# Patient Record
Sex: FEMALE | Race: WHITE | Employment: FULL TIME | URBAN - NONMETROPOLITAN AREA
[De-identification: names, ages, dates, MRNs, and addresses within clinical notes are randomized per-mention and may not be internally consistent; named-entity substitution may affect disease eponyms.]

---

## 2022-08-16 ENCOUNTER — OFFICE VISIT (OUTPATIENT)
Dept: PRIMARY CARE CLINIC | Age: 52
End: 2022-08-16

## 2022-08-16 VITALS
OXYGEN SATURATION: 98 % | DIASTOLIC BLOOD PRESSURE: 72 MMHG | SYSTOLIC BLOOD PRESSURE: 122 MMHG | HEIGHT: 60 IN | HEART RATE: 55 BPM | WEIGHT: 229 LBS | BODY MASS INDEX: 44.96 KG/M2 | RESPIRATION RATE: 18 BRPM

## 2022-08-16 DIAGNOSIS — R20.2 LEFT HAND PARESTHESIA: ICD-10-CM

## 2022-08-16 DIAGNOSIS — S16.1XXA STRAIN OF CERVICAL PORTION OF LEFT TRAPEZIUS MUSCLE: Primary | ICD-10-CM

## 2022-08-16 RX ORDER — LISINOPRIL 40 MG/1
40 TABLET ORAL DAILY
COMMUNITY

## 2022-08-16 RX ORDER — SPIRONOLACTONE 25 MG/1
25 TABLET ORAL DAILY
COMMUNITY

## 2022-08-16 RX ORDER — BUSPIRONE HYDROCHLORIDE 10 MG/1
10 TABLET ORAL 3 TIMES DAILY PRN
COMMUNITY

## 2022-08-16 RX ORDER — TOPIRAMATE 50 MG/1
50 TABLET, FILM COATED ORAL 2 TIMES DAILY
COMMUNITY

## 2022-08-16 RX ORDER — ASPIRIN 81 MG/1
81 TABLET, CHEWABLE ORAL DAILY
COMMUNITY

## 2022-08-16 RX ORDER — METOPROLOL SUCCINATE 25 MG/1
25 TABLET, EXTENDED RELEASE ORAL DAILY
COMMUNITY

## 2022-08-16 RX ORDER — PREDNISONE 20 MG/1
40 TABLET ORAL DAILY
Qty: 10 TABLET | Refills: 0 | Status: SHIPPED | OUTPATIENT
Start: 2022-08-16 | End: 2022-08-21

## 2022-08-16 ASSESSMENT — ENCOUNTER SYMPTOMS
EYES NEGATIVE: 1
VISUAL CHANGE: 0
TROUBLE SWALLOWING: 0

## 2022-08-16 NOTE — PATIENT INSTRUCTIONS
Ice glides, conservative treatment  Follow up in 3-5 days if symptoms are worsening for possible x ray of cervical spine.

## 2022-08-16 NOTE — PROGRESS NOTES
[address] [facility] [address] [place] [address]  Dept: [phone]    Vicki Garner (:  1970) is a 47 y.o. female,New patient, here for evaluation of the following chief complaint(s):  Neck Pain (Believes she might have slept wrong, having left sided shoulder/neck pain that goes down her left arm )  Walk in    ASSESSMENT/PLAN:  1. Strain of cervical portion of left trapezius muscle  2. Left hand paresthesia    Suggested conservative care for suspect muscle strain and trigger point of the left trapezius  Applied Biofreeze in office today and ice pack. She took ibuprofen this morning. Advised to rotate tylenol otc as well        Neck AROM limited due to stiffness and muscle pain:  Extension 10  forward flexion 40  Right lateral flexion 20   left lateral flexion 20  Right rotation 20  Left rotation 20      Advised stretches  Feel paresthesias are related to muscle inflammation and poor posture since this  intensified after coming to work. 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 Advised rest, ice, anti inflammatories  AVS printed with instructions  Follow up 3-5 days if not improving    No follow-ups on file. SUBJECTIVE/OBJECTIVE:  Kathryn awoke today with a stiff posterior neck and tightness to her back muscles. This is located in her upper left posterior shoulder region/trap. She reports as the day progresses the pain is increasing in a tightness sensation and starting to have some paresthesias radiate down to 2 fingers in her left hand  She denies any injury, States she sleeps with her head and neck iin a forward flexed position    Neck Pain   This is a new problem. The current episode started today. The problem occurs constantly. The problem has been gradually worsening. The pain is associated with a sleep position. The quality of the pain is described as shooting.  The pain is at a severity of 5/10. The pain is moderate. The symptoms are aggravated by coughing, bending and position. Associated symptoms include numbness and tingling. Pertinent negatives include no chest pain, fever, headaches, leg pain, pain with swallowing, paresis, syncope, trouble swallowing, visual change, weakness or weight loss. Associated symptoms comments: Left hand 4th and 5th digits. She has tried NSAIDs for the symptoms. The treatment provided no relief. Review of Systems   Constitutional:  Negative for fatigue, fever and weight loss. HENT:  Negative for trouble swallowing. Eyes: Negative. Cardiovascular:  Negative for chest pain and syncope. Musculoskeletal:  Positive for myalgias and neck pain. Negative for arthralgias and neck stiffness. Skin: Negative. Neurological:  Positive for tingling and numbness. Negative for dizziness, weakness and headaches. Physical Exam  Constitutional:       Appearance: Normal appearance. She is obese. Cardiovascular:      Pulses: Normal pulses. Musculoskeletal:         General: No swelling, deformity or signs of injury. Cervical back: Tenderness present. No swelling or bony tenderness. Pain with movement present. Decreased range of motion. Back:    Skin:     General: Skin is warm and dry. Capillary Refill: Capillary refill takes less than 2 seconds. Neurological:      General: No focal deficit present. Mental Status: She is alert and oriented to person, place, and time. Additional Information:    /72 (Site: Left Upper Arm, Position: Sitting, Cuff Size: Large Adult)   Pulse 55   Resp 18   Ht 5' (1.524 m)   Wt 229 lb (103.9 kg)   SpO2 98%   BMI 44.72 kg/m²     An electronic signature was used to authenticate this note.     --CANDACE Sena - CNP

## 2023-03-30 ENCOUNTER — OFFICE VISIT (OUTPATIENT)
Dept: PRIMARY CARE CLINIC | Age: 53
End: 2023-03-30

## 2023-03-30 VITALS
DIASTOLIC BLOOD PRESSURE: 72 MMHG | RESPIRATION RATE: 16 BRPM | SYSTOLIC BLOOD PRESSURE: 118 MMHG | HEIGHT: 60 IN | HEART RATE: 52 BPM | OXYGEN SATURATION: 98 % | WEIGHT: 215 LBS | BODY MASS INDEX: 42.21 KG/M2

## 2023-03-30 DIAGNOSIS — Z00.8 ENCOUNTER FOR BIOMETRIC SCREENING: Primary | ICD-10-CM

## 2023-03-30 DIAGNOSIS — M79.89 LEG SWELLING: ICD-10-CM

## 2023-03-30 RX ORDER — AMLODIPINE BESYLATE 10 MG/1
10 TABLET ORAL DAILY
COMMUNITY
Start: 2023-03-16 | End: 2023-04-15

## 2023-03-30 RX ORDER — FAMOTIDINE 10 MG
10 TABLET ORAL 2 TIMES DAILY
COMMUNITY
Start: 2023-03-16

## 2023-03-30 ASSESSMENT — ENCOUNTER SYMPTOMS
PHOTOPHOBIA: 0
CONSTIPATION: 0
SHORTNESS OF BREATH: 0
DIARRHEA: 0
ABDOMINAL PAIN: 0
CHEST TIGHTNESS: 0

## 2023-03-30 NOTE — PATIENT INSTRUCTIONS
If you are concerned with the ankle swelling, discuss with the provider who prescribes your BP meds. This is a common SE of Amlodipine.

## 2023-03-30 NOTE — PROGRESS NOTES
[address]  Dept: [phone]  Dept Fax: : [phone]  Loc Fax: [phone]    Mike Bruce is a 47 y.o. female who presents today for her medical conditions/complaints as noted below. Chief Complaint   Patient presents with    Wellness Program     Biometric screening            HPI:     47year old female here for Biometric  Pt has PCP  On HTN meds  She is noticing some ankle swelling, reports has had med Change recenty now taking Amlodipine    She has recently had some weight loss  Attempting to make  healthy choices in diet and activity        Current Outpatient Medications   Medication Sig Dispense Refill    amLODIPine (NORVASC) 10 MG tablet Take 10 mg by mouth daily      famotidine (PEPCID) 10 MG tablet Take 10 mg by mouth 2 times daily      topiramate (TOPAMAX) 50 MG tablet Take 50 mg by mouth in the morning and 50 mg before bedtime. spironolactone (ALDACTONE) 25 MG tablet Take 25 mg by mouth in the morning. metoprolol succinate (TOPROL XL) 25 MG extended release tablet Take 25 mg by mouth in the morning. lisinopril (PRINIVIL;ZESTRIL) 40 MG tablet Take 40 mg by mouth in the morning. busPIRone (BUSPAR) 10 MG tablet Take 10 mg by mouth 3 times daily as needed      aspirin 81 MG chewable tablet Take 81 mg by mouth in the morning. No current facility-administered medications for this visit. No Known Allergies    Subjective:      Review of Systems   Constitutional:  Negative for chills, fatigue and fever. HENT:  Negative for congestion. Eyes:  Negative for photophobia and visual disturbance. Respiratory:  Negative for chest tightness and shortness of breath. Cardiovascular:  Positive for leg swelling. Negative for chest pain and palpitations. Gastrointestinal:  Negative for abdominal pain, constipation and diarrhea.    Endocrine:

## 2023-10-12 ENCOUNTER — HOSPITAL ENCOUNTER (EMERGENCY)
Age: 53
Discharge: HOME | End: 2023-10-12
Payer: COMMERCIAL

## 2023-10-12 ENCOUNTER — APPOINTMENT (OUTPATIENT)
Dept: GENERAL RADIOLOGY | Age: 53
End: 2023-10-12
Payer: COMMERCIAL

## 2023-10-12 VITALS
HEIGHT: 60 IN | HEART RATE: 66 BPM | SYSTOLIC BLOOD PRESSURE: 121 MMHG | OXYGEN SATURATION: 99 % | TEMPERATURE: 98.8 F | RESPIRATION RATE: 19 BRPM | BODY MASS INDEX: 41.23 KG/M2 | WEIGHT: 210 LBS | DIASTOLIC BLOOD PRESSURE: 64 MMHG

## 2023-10-12 DIAGNOSIS — R42 LIGHTHEADEDNESS: Primary | ICD-10-CM

## 2023-10-12 DIAGNOSIS — R55 NEAR SYNCOPE: ICD-10-CM

## 2023-10-12 DIAGNOSIS — R07.9 CHEST PAIN, UNSPECIFIED TYPE: ICD-10-CM

## 2023-10-12 PROBLEM — I49.3 ASYMPTOMATIC PVCS: Status: ACTIVE | Noted: 2023-10-12

## 2023-10-12 LAB
ANION GAP SERPL CALC-SCNC: 14 MEQ/L (ref 8–16)
BACTERIA: ABNORMAL
BASOPHILS ABSOLUTE: 0.1 THOU/MM3 (ref 0–0.1)
BASOPHILS NFR BLD AUTO: 1 %
BILIRUB UR QL STRIP: NEGATIVE
BUN SERPL-MCNC: 16 MG/DL (ref 7–22)
CALCIUM SERPL-MCNC: 9.7 MG/DL (ref 8.5–10.5)
CASTS #/AREA URNS LPF: ABNORMAL /LPF
CASTS #/AREA URNS LPF: ABNORMAL /LPF
CHARACTER UR: ABNORMAL
CHARCOAL URNS QL MICRO: ABNORMAL
CHLORIDE SERPL-SCNC: 106 MEQ/L (ref 98–111)
CO2 SERPL-SCNC: 20 MEQ/L (ref 23–33)
COLOR UR: YELLOW
CREAT SERPL-MCNC: 1 MG/DL (ref 0.4–1.2)
CRYSTALS URNS QL MICRO: ABNORMAL
DEPRECATED RDW RBC AUTO: 43.5 FL (ref 35–45)
EKG ATRIAL RATE: 73 BPM
EKG P AXIS: 45 DEGREES
EKG P-R INTERVAL: 142 MS
EKG Q-T INTERVAL: 392 MS
EKG QRS DURATION: 106 MS
EKG QTC CALCULATION (BAZETT): 431 MS
EKG R AXIS: -15 DEGREES
EKG T AXIS: 63 DEGREES
EKG VENTRICULAR RATE: 73 BPM
EOSINOPHIL NFR BLD AUTO: 3.3 %
EOSINOPHILS ABSOLUTE: 0.3 THOU/MM3 (ref 0–0.4)
EPITHELIAL CELLS, UA: ABNORMAL /HPF
ERYTHROCYTE [DISTWIDTH] IN BLOOD BY AUTOMATED COUNT: 12.8 % (ref 11.5–14.5)
GFR SERPL CREATININE-BSD FRML MDRD: > 60 ML/MIN/1.73M2
GLUCOSE SERPL-MCNC: 111 MG/DL (ref 70–108)
GLUCOSE UR QL STRIP.AUTO: NEGATIVE MG/DL
HCT VFR BLD AUTO: 44 % (ref 37–47)
HGB BLD-MCNC: 14.5 GM/DL (ref 12–16)
HGB UR QL STRIP.AUTO: NEGATIVE
IMM GRANULOCYTES # BLD AUTO: 0.04 THOU/MM3 (ref 0–0.07)
IMM GRANULOCYTES NFR BLD AUTO: 0.4 %
KETONES UR QL STRIP.AUTO: NEGATIVE
LEUKOCYTE ESTERASE UR QL STRIP.AUTO: ABNORMAL
LYMPHOCYTES ABSOLUTE: 2.1 THOU/MM3 (ref 1–4.8)
LYMPHOCYTES NFR BLD AUTO: 23 %
MCH RBC QN AUTO: 30.5 PG (ref 26–33)
MCHC RBC AUTO-ENTMCNC: 33 GM/DL (ref 32.2–35.5)
MCV RBC AUTO: 92.4 FL (ref 81–99)
MONOCYTES ABSOLUTE: 0.7 THOU/MM3 (ref 0.4–1.3)
MONOCYTES NFR BLD AUTO: 8 %
NEUTROPHILS NFR BLD AUTO: 64.3 %
NITRITE UR QL STRIP.AUTO: NEGATIVE
NRBC BLD AUTO-RTO: 0 /100 WBC
NT-PROBNP SERPL IA-MCNC: 100.9 PG/ML (ref 0–124)
OSMOLALITY SERPL CALC.SUM OF ELEC: 281.3 MOSMOL/KG (ref 275–300)
PH UR STRIP.AUTO: 7 [PH] (ref 5–9)
PLATELET # BLD AUTO: 321 THOU/MM3 (ref 130–400)
PMV BLD AUTO: 10.6 FL (ref 9.4–12.4)
POTASSIUM SERPL-SCNC: 4.3 MEQ/L (ref 3.5–5.2)
PROT UR STRIP.AUTO-MCNC: NEGATIVE MG/DL
RBC # BLD AUTO: 4.76 MILL/MM3 (ref 4.2–5.4)
RBC #/AREA URNS HPF: ABNORMAL /HPF
RENAL EPI CELLS #/AREA URNS HPF: ABNORMAL /[HPF]
SEGMENTED NEUTROPHILS ABSOLUTE COUNT: 5.9 THOU/MM3 (ref 1.8–7.7)
SODIUM SERPL-SCNC: 140 MEQ/L (ref 135–145)
SPECIFIC GRAVITY UA: 1.01 (ref 1–1.03)
TROPONIN, HIGH SENSITIVITY: 7 NG/L (ref 0–12)
UROBILINOGEN, URINE: 0.2 EU/DL (ref 0–1)
WBC # BLD AUTO: 9.1 THOU/MM3 (ref 4.8–10.8)
WBC #/AREA URNS HPF: ABNORMAL /HPF
YEAST LIKE FUNGI URNS QL MICRO: ABNORMAL

## 2023-10-12 PROCEDURE — 93005 ELECTROCARDIOGRAM TRACING: CPT

## 2023-10-12 PROCEDURE — 84484 ASSAY OF TROPONIN QUANT: CPT

## 2023-10-12 PROCEDURE — 99285 EMERGENCY DEPT VISIT HI MDM: CPT

## 2023-10-12 PROCEDURE — 36415 COLL VENOUS BLD VENIPUNCTURE: CPT

## 2023-10-12 PROCEDURE — 93010 ELECTROCARDIOGRAM REPORT: CPT | Performed by: INTERNAL MEDICINE

## 2023-10-12 PROCEDURE — 80048 BASIC METABOLIC PNL TOTAL CA: CPT

## 2023-10-12 PROCEDURE — 83880 ASSAY OF NATRIURETIC PEPTIDE: CPT

## 2023-10-12 PROCEDURE — 2580000003 HC RX 258

## 2023-10-12 PROCEDURE — 71046 X-RAY EXAM CHEST 2 VIEWS: CPT

## 2023-10-12 PROCEDURE — 81001 URINALYSIS AUTO W/SCOPE: CPT

## 2023-10-12 PROCEDURE — 85025 COMPLETE CBC W/AUTO DIFF WBC: CPT

## 2023-10-12 RX ORDER — DILTIAZEM HYDROCHLORIDE EXTENDED-RELEASE TABLETS 120 MG/1
120 TABLET, EXTENDED RELEASE ORAL DAILY
COMMUNITY
Start: 2023-09-26 | End: 2023-10-12

## 2023-10-12 RX ORDER — TORSEMIDE 20 MG/1
20 TABLET ORAL DAILY
COMMUNITY
Start: 2023-09-27

## 2023-10-12 RX ORDER — 0.9 % SODIUM CHLORIDE 0.9 %
1000 INTRAVENOUS SOLUTION INTRAVENOUS ONCE
Status: COMPLETED | OUTPATIENT
Start: 2023-10-12 | End: 2023-10-12

## 2023-10-12 RX ADMIN — SODIUM CHLORIDE 1000 ML: 9 INJECTION, SOLUTION INTRAVENOUS at 14:30

## 2023-10-12 ASSESSMENT — PAIN - FUNCTIONAL ASSESSMENT
PAIN_FUNCTIONAL_ASSESSMENT: NONE - DENIES PAIN

## 2023-10-12 NOTE — ED PROVIDER NOTES
[address] [facility] EMERGENCY DEPT      EMERGENCY MEDICINE     Pt Name: Zahra Bonner  MRN: [id number]  [address] [facility] 1970  Date of evaluation: 10/12/2023  Provider: Home Bliss PA-C    CHIEF COMPLAINT       Chief Complaint   Patient presents with    Dizziness     HISTORY OF PRESENT ILLNESS   Zahra Bonner is a 47 y.o. female who presents to the ED for evaluation of dizziness onset this morning. Patient states that she has a history of hypertension and intermittent A-fib and is on lisinopril, amlodipine, diltiazem, spironolactone, and torsemide for her hypertension and A-fib. Patient states that the diltiazem she was recently started on and has not been only taking it for approximately 2 weeks. Patient states that she takes her blood pressure medications at night and typically feels some symptoms of lightheadedness after taking her meds. Patient states that this morning she was at work and developed worse lightheadedness to the point that she felt like she could almost pass out. Patient states that she checked her Apple Watch and she noted her heart rate was in the 40s. Patient states that since that episode she has been feeling better and her heart rate has been normal.  Patient admits to some mild chest pain. Patient denies fever, chills, body aches, headache, vision changes, vertigo, cough, shortness of breath, abdominal pain, nausea, vomiting, dysuria, and neck pain.         PASTMEDICAL HISTORY     Past Medical History:   Diagnosis Date    Hypertension        Patient Active Problem List   Diagnosis Code    Asymptomatic PVCs I49.3     SURGICAL HISTORY       Past Surgical History:   Procedure Laterality Date    HYSTERECTOMY (CERVIX STATUS UNKNOWN)         CURRENT MEDICATIONS       Discharge Medication List as of 10/12/2023  4:31 PM        CONTINUE these medications which have NOT CHANGED    Details   torsemide (DEMADEX) 20 MG tablet Take 1 tablet by mouth dailyHistorical Med      amLODIPine (NORVASC) 10 PM          FINAL IMPRESSION      1. Lightheadedness    2. Near syncope    3.  Chest pain, unspecified type          DISPOSITION/PLAN   DISPOSITION Decision To Discharge 10/12/2023 04:25:11 PM      OUTPATIENT FOLLOW UP THE PATIENT:  Elaine Olmedo, [address]  [phone]    In 5 days        (Please note that portions of this note were completed with a voice recognition program.  Efforts were made to edit the dictations but occasionally words are mis-transcribed.)    Gabriela Ayala PA-C 10/12/23 9:42 PM    JEFF Gomez PA-C  10/12/23 [id number]

## 2023-10-12 NOTE — ED NOTES
Pt resting in bed, respirations even and unlabored. No needs expressed at this time. Call light within reach.  VSNOEMI Vickers, [place]  10/12/23 [id number]

## 2023-10-12 NOTE — ED NOTES
Pt updated on POC. Orthostatic VS completed. Fluids started. No needs expressed at this time.  VSS     FloresSandy, [place]  10/12/23 8600

## 2023-10-12 NOTE — ED NOTES
Pt ambulated to bathroom by self, pt tolerated well. Pt states she not feel as dizzy as she did.       Jen Hunt, [address]  10/12/23 1300

## 2023-10-16 LAB
EKG ATRIAL RATE: 73 BPM
EKG P AXIS: 45 DEGREES
EKG P-R INTERVAL: 142 MS
EKG Q-T INTERVAL: 392 MS
EKG QRS DURATION: 106 MS
EKG QTC CALCULATION (BAZETT): 431 MS
EKG R AXIS: -15 DEGREES
EKG T AXIS: 63 DEGREES
EKG VENTRICULAR RATE: 73 BPM

## 2024-01-30 ENCOUNTER — HOSPITAL ENCOUNTER (OUTPATIENT)
Dept: GENERAL RADIOLOGY | Age: 54
Discharge: HOME | End: 2024-01-30
Payer: COMMERCIAL

## 2024-01-30 ENCOUNTER — OFFICE VISIT (OUTPATIENT)
Dept: RHEUMATOLOGY | Age: 54
End: 2024-01-30
Payer: COMMERCIAL

## 2024-01-30 ENCOUNTER — HOSPITAL ENCOUNTER (OUTPATIENT)
Age: 54
Discharge: HOME | End: 2024-01-30
Payer: COMMERCIAL

## 2024-01-30 VITALS
OXYGEN SATURATION: 98 % | SYSTOLIC BLOOD PRESSURE: 138 MMHG | HEART RATE: 55 BPM | WEIGHT: 212 LBS | HEIGHT: 60 IN | BODY MASS INDEX: 41.62 KG/M2 | DIASTOLIC BLOOD PRESSURE: 74 MMHG

## 2024-01-30 DIAGNOSIS — R76.8 POSITIVE ANA (ANTINUCLEAR ANTIBODY): Primary | ICD-10-CM

## 2024-01-30 DIAGNOSIS — R76.8 POSITIVE ANA (ANTINUCLEAR ANTIBODY): ICD-10-CM

## 2024-01-30 DIAGNOSIS — G89.29 CHRONIC PAIN OF RIGHT KNEE: ICD-10-CM

## 2024-01-30 DIAGNOSIS — M25.561 CHRONIC PAIN OF RIGHT KNEE: ICD-10-CM

## 2024-01-30 DIAGNOSIS — M54.9 MID BACK PAIN: ICD-10-CM

## 2024-01-30 LAB
BACTERIA: ABNORMAL
BILIRUB UR QL STRIP: NEGATIVE
C3C SERPL-MCNC: 138 MG/DL (ref 90–180)
C4 SERPL-MCNC: 22 MG/DL (ref 10–40)
CASTS #/AREA URNS LPF: ABNORMAL /LPF
CASTS #/AREA URNS LPF: ABNORMAL /LPF
CHARACTER UR: ABNORMAL
CHARCOAL URNS QL MICRO: ABNORMAL
COLOR UR: YELLOW
CREAT UR-MCNC: 112.9 MG/DL
CRYSTALS URNS QL MICRO: ABNORMAL
EPITHELIAL CELLS, UA: ABNORMAL /HPF
GLUCOSE UR QL STRIP.AUTO: NEGATIVE MG/DL
HGB UR QL STRIP.AUTO: NEGATIVE
KETONES UR QL STRIP.AUTO: NEGATIVE
LEUKOCYTE ESTERASE UR QL STRIP.AUTO: NEGATIVE
NITRITE UR QL STRIP.AUTO: NEGATIVE
PH UR STRIP.AUTO: 7 [PH] (ref 5–9)
PROT UR STRIP.AUTO-MCNC: NEGATIVE MG/DL
PROT UR-MCNC: 17.5 MG/DL
PROT/CREAT 24H UR: 0.16 MG/G{CREAT}
RBC #/AREA URNS HPF: ABNORMAL /HPF
RENAL EPI CELLS #/AREA URNS HPF: ABNORMAL /[HPF]
SP GR UR REFRACT.AUTO: 1.02 (ref 1–1.03)
UROBILINOGEN UR QL STRIP.AUTO: 0.2 EU/DL (ref 0–1)
WBC #/AREA URNS HPF: ABNORMAL /HPF
YEAST LIKE FUNGI URNS QL MICRO: ABNORMAL

## 2024-01-30 PROCEDURE — 82570 ASSAY OF URINE CREATININE: CPT

## 2024-01-30 PROCEDURE — 81001 URINALYSIS AUTO W/SCOPE: CPT

## 2024-01-30 PROCEDURE — 84156 ASSAY OF PROTEIN URINE: CPT

## 2024-01-30 PROCEDURE — 36415 COLL VENOUS BLD VENIPUNCTURE: CPT

## 2024-01-30 PROCEDURE — 86160 COMPLEMENT ANTIGEN: CPT

## 2024-01-30 PROCEDURE — 99204 OFFICE O/P NEW MOD 45 MIN: CPT | Performed by: INTERNAL MEDICINE

## 2024-01-30 PROCEDURE — 86200 CCP ANTIBODY: CPT

## 2024-01-30 PROCEDURE — 73562 X-RAY EXAM OF KNEE 3: CPT

## 2024-01-30 PROCEDURE — 72100 X-RAY EXAM L-S SPINE 2/3 VWS: CPT

## 2024-01-30 PROCEDURE — 72072 X-RAY EXAM THORAC SPINE 3VWS: CPT

## 2024-01-30 RX ORDER — ERGOCALCIFEROL 1.25 MG/1
50000 CAPSULE ORAL WEEKLY
COMMUNITY

## 2024-01-30 RX ORDER — OMEPRAZOLE 40 MG/1
40 CAPSULE, DELAYED RELEASE ORAL DAILY
COMMUNITY
Start: 2023-11-01 | End: 2024-10-31

## 2024-01-30 NOTE — PROGRESS NOTES
appearance.   HENT:      Mouth/Throat:      Mouth: Mucous membranes are dry.      Pharynx: Oropharynx is clear.   Eyes:      Conjunctiva/sclera: Conjunctivae normal.   Cardiovascular:      Rate and Rhythm: Normal rate and regular rhythm.      Heart sounds: Normal heart sounds. No murmur heard.     No friction rub.   Pulmonary:      Effort: Pulmonary effort is normal. No respiratory distress.      Breath sounds: Normal breath sounds. No wheezing or rhonchi.   Musculoskeletal:         General: No swelling, tenderness or deformity. Normal range of motion.      Cervical back: Normal range of motion and neck supple.   Lymphadenopathy:      Cervical: No cervical adenopathy.   Skin:     General: Skin is warm and dry.      Findings: No lesion or rash (redness in cheeks).   Neurological:      General: No focal deficit present.      Mental Status: She is alert and oriented to person, place, and time.      Gait: Gait normal.   Psychiatric:         Mood and Affect: Mood normal.            DATA:    Component 11/1/2023 Comments   Antinuclear Antibodies, IFA Positive Abnormal       Component  Ref Range & Units 11/1/2023   RHEUMATOID FACTOR LEVELS IGM  <14.0 IU/mL <10.0     Component  Ref Range & Units 11/1/2023 Comments   HOMOGENEOUS PATTERN 1:160 High     Midbody Pattern 1:160 High     dsDNA Ab  0 - 9 IU/mL 1 Negative      <5  Equivocal  5 - 9  Positive      >9   RNP Antibodies  0.0 - 0.9 AI 1.3 High     Smith Antibodies  0.0 - 0.9 AI <0.2    Weinstein/RNP Antibodies  0.0 - 0.9 AI <0.2    Antiscleroderma-70 Antibodies  0.0 - 0.9 AI <0.2    Sjogren's Anti-SS-A  0.0 - 0.9 AI <0.2    Sjogren's Anti-SS-B  0.0 - 0.9 AI <0.2    Antichromatin IgG, Antibodies  0.0 - 0.9 AI <0.2    Ribosomal P Ab  0.0 - 0.9 AI <0.2    Anti YULISSA-1  0.0 - 0.9 AI <0.2    Anti-Centromere B Antibodies  0.0 - 0.9 AI <0.2      Component  Ref Range & Units 11/1/2023   Glucose  70 - 99 mg/dL 96   BUN  6 - 24 mg/dL 13   Creatinine, Ser  0.57 - 1.00 mg/dL 0.94   eGFR

## 2024-02-01 LAB — CYCLIC CITRULLINATED PEPTIDE ANTIBODY IGG: 0.9 U/ML (ref 0–7)

## 2024-02-01 ASSESSMENT — ENCOUNTER SYMPTOMS
BLOOD IN STOOL: 0
ABDOMINAL PAIN: 0
COUGH: 0
EYE PAIN: 1
WHEEZING: 0
NAUSEA: 0
SHORTNESS OF BREATH: 1
VOMITING: 0

## 2024-02-14 ENCOUNTER — OFFICE VISIT (OUTPATIENT)
Dept: RHEUMATOLOGY | Age: 54
End: 2024-02-14
Payer: COMMERCIAL

## 2024-02-14 ENCOUNTER — NURSE ONLY (OUTPATIENT)
Dept: LAB | Age: 54
End: 2024-02-14

## 2024-02-14 VITALS
WEIGHT: 211 LBS | HEIGHT: 60 IN | OXYGEN SATURATION: 98 % | HEART RATE: 59 BPM | SYSTOLIC BLOOD PRESSURE: 142 MMHG | DIASTOLIC BLOOD PRESSURE: 70 MMHG | BODY MASS INDEX: 41.43 KG/M2

## 2024-02-14 DIAGNOSIS — G89.29 CHRONIC MIDLINE THORACIC BACK PAIN: ICD-10-CM

## 2024-02-14 DIAGNOSIS — R76.8 POSITIVE ANA (ANTINUCLEAR ANTIBODY): ICD-10-CM

## 2024-02-14 DIAGNOSIS — M54.6 CHRONIC MIDLINE THORACIC BACK PAIN: ICD-10-CM

## 2024-02-14 DIAGNOSIS — M51.34 DDD (DEGENERATIVE DISC DISEASE), THORACIC: Primary | ICD-10-CM

## 2024-02-14 PROCEDURE — 99214 OFFICE O/P EST MOD 30 MIN: CPT | Performed by: INTERNAL MEDICINE

## 2024-02-14 NOTE — PROGRESS NOTES
reviewed with pt her xray images. 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   -- assess HLA-B27  -- recommend physical therapy    2. Positive ERIC. Strong family h/o SLE/cutaneous lupus. Has joint pain (spine and peripheral joints), facial rash, Raynaud's.  -- reassured pt that at this time, she does not have lupus or features of other connective tissue disorder    RTC in 2 months    Orders Placed This Encounter   Procedures    HLA-B27 Antigen     Standing Status:   Future     Number of Occurrences:   1     Standing Expiration Date:   2/14/2025    [facility]  [place]     Referral Priority:   Routine     Referral Type:   Eval and Treat     Referral Reason:   Specialty Services Required     Requested Specialty:   Physical Therapist     Number of Visits Requested:   1        Mery Oliveira MD    [facility] RHEUMATOLOGY  [address] [id number] [phone]

## 2024-02-19 LAB
REASON FOR REJECTION: NORMAL
REJECTED TEST: NORMAL

## 2024-02-21 ENCOUNTER — TELEPHONE (OUTPATIENT)
Dept: RHEUMATOLOGY | Age: 54
End: 2024-02-21

## 2024-02-21 DIAGNOSIS — M54.6 CHRONIC MIDLINE THORACIC BACK PAIN: Primary | ICD-10-CM

## 2024-02-21 DIAGNOSIS — G89.29 CHRONIC MIDLINE THORACIC BACK PAIN: Primary | ICD-10-CM

## 2024-02-21 NOTE — TELEPHONE ENCOUNTER
When seen last, lab (HLA-B27) was ordered. Received test alert from lab stating that sample error due to transported specimen at incorrect temperature.    Please contact pt to get test done. New order is in the chart.

## 2024-03-20 ENCOUNTER — TELEPHONE (OUTPATIENT)
Dept: RHEUMATOLOGY | Age: 54
End: 2024-03-20

## 2024-03-20 NOTE — TELEPHONE ENCOUNTER
Please inform pt that the test HLA-B27 came back negative. This is the test that is associated with the condition ankylosing spondylitis. At this time, I do not believe that she has this condition.    Recommendation is the same as discussed in last office visit: physical therapy.

## 2025-02-20 LAB
A/G RATIO: 1.3 (ref 0.8–2)
ALBUMIN ELP: 4.2 G/DL (ref 3.5–5.2)
ALPHA 2: 0.7 G/DL (ref 0.5–1.1)
ALPHA-1-GLOBULIN: 0.3 G/DL (ref 0.2–0.5)
BETA GLOBULIN: 0.9 G/DL (ref 0.6–1.1)
GAMMA GLOBULIN: 1.2 G/DL (ref 0.5–1.5)
PROTEIN PATTERN: NORMAL
TOTAL PROTEIN: 7.3 G/DL (ref 6–8.3)